# Patient Record
Sex: MALE | Race: WHITE | NOT HISPANIC OR LATINO
[De-identification: names, ages, dates, MRNs, and addresses within clinical notes are randomized per-mention and may not be internally consistent; named-entity substitution may affect disease eponyms.]

---

## 2017-11-10 PROBLEM — Z00.00 ENCOUNTER FOR PREVENTIVE HEALTH EXAMINATION: Status: ACTIVE | Noted: 2017-11-10

## 2021-03-22 ENCOUNTER — APPOINTMENT (OUTPATIENT)
Dept: GASTROENTEROLOGY | Facility: CLINIC | Age: 76
End: 2021-03-22
Payer: COMMERCIAL

## 2021-03-22 VITALS
HEART RATE: 69 BPM | OXYGEN SATURATION: 97 % | DIASTOLIC BLOOD PRESSURE: 81 MMHG | WEIGHT: 249 LBS | HEIGHT: 72 IN | SYSTOLIC BLOOD PRESSURE: 130 MMHG | TEMPERATURE: 96.9 F | BODY MASS INDEX: 33.72 KG/M2

## 2021-03-22 DIAGNOSIS — Z82.49 FAMILY HISTORY OF ISCHEMIC HEART DISEASE AND OTHER DISEASES OF THE CIRCULATORY SYSTEM: ICD-10-CM

## 2021-03-22 DIAGNOSIS — Z87.891 PERSONAL HISTORY OF NICOTINE DEPENDENCE: ICD-10-CM

## 2021-03-22 DIAGNOSIS — Z86.79 PERSONAL HISTORY OF OTHER DISEASES OF THE CIRCULATORY SYSTEM: ICD-10-CM

## 2021-03-22 DIAGNOSIS — Z98.890 OTHER SPECIFIED POSTPROCEDURAL STATES: ICD-10-CM

## 2021-03-22 DIAGNOSIS — Z95.5 PRESENCE OF CORONARY ANGIOPLASTY IMPLANT AND GRAFT: ICD-10-CM

## 2021-03-22 DIAGNOSIS — Z86.39 PERSONAL HISTORY OF OTHER ENDOCRINE, NUTRITIONAL AND METABOLIC DISEASE: ICD-10-CM

## 2021-03-22 PROCEDURE — 99203 OFFICE O/P NEW LOW 30 MIN: CPT

## 2021-03-22 PROCEDURE — 99072 ADDL SUPL MATRL&STAF TM PHE: CPT

## 2021-03-22 RX ORDER — LOSARTAN POTASSIUM 100 MG/1
100 TABLET, FILM COATED ORAL
Qty: 90 | Refills: 0 | Status: ACTIVE | COMMUNITY
Start: 2021-02-04

## 2021-03-22 RX ORDER — ATORVASTATIN CALCIUM 40 MG/1
40 TABLET, FILM COATED ORAL
Qty: 90 | Refills: 0 | Status: ACTIVE | COMMUNITY
Start: 2020-12-15

## 2021-03-22 RX ORDER — MULTIVITAMIN
TABLET ORAL
Refills: 0 | Status: ACTIVE | COMMUNITY

## 2021-03-22 RX ORDER — GINSENG 100 MG
CAPSULE ORAL
Refills: 0 | Status: ACTIVE | COMMUNITY

## 2021-03-22 RX ORDER — ASPIRIN 81 MG
81 TABLET, DELAYED RELEASE (ENTERIC COATED) ORAL
Refills: 0 | Status: ACTIVE | COMMUNITY

## 2021-03-22 RX ORDER — CLOPIDOGREL BISULFATE 75 MG/1
75 TABLET, FILM COATED ORAL
Qty: 90 | Refills: 0 | Status: ACTIVE | COMMUNITY
Start: 2021-03-09

## 2021-03-22 RX ORDER — CARVEDILOL 12.5 MG/1
12.5 TABLET, FILM COATED ORAL
Qty: 180 | Refills: 0 | Status: ACTIVE | COMMUNITY
Start: 2021-01-08

## 2021-03-22 RX ORDER — LEVOTHYROXINE SODIUM 0.09 MG/1
88 TABLET ORAL
Qty: 90 | Refills: 0 | Status: ACTIVE | COMMUNITY
Start: 2021-02-04

## 2021-03-23 NOTE — REASON FOR VISIT
[Initial Evaluation] : an initial evaluation [FreeTextEntry1] : History of adenomatous colonic polyps

## 2021-03-23 NOTE — HISTORY OF PRESENT ILLNESS
[FreeTextEntry1] : The patient is a 75-year-old man with a history of adenomatous colonic polyps.  His last colonoscopy was in January 2017.  He has a history of 2 cardiac stents placed in August 2015 and then in August 2020.  After the admission for the stent in August the patient had a subsequent admission for an ablation of PVCs.  He currently feels well denying abdominal pain, diarrhea, constipation, melena, bright red blood per rectum.  The patient reports 1 bowel movement a day.  He denies heartburn or dysphagia.  The patient's weight is stable.  The patient was hospitalized for stent and ablation in August 2020 as above.

## 2021-03-23 NOTE — ASSESSMENT
[FreeTextEntry1] : Patient with a history of adenomatous colonic polyps.  He had a cardiac stent and an ablation in August 2020.  I advised the patient that it is safest to wait at least 1 year until after stent placement prior to pursuing an elective colonoscopy and holding clopidogrel.\par \par Therefore, the patient will return to see me in 6 months.  At that time, we will pursue colonoscopy if adequate cardiac clearance is given and we can hold the clopidogrel for 5 days.

## 2021-03-23 NOTE — CONSULT LETTER
[FreeTextEntry1] : Dear Dr. Bryan No and Dr. Marty Mejia,\par \par I had the pleasure of seeing your patient AUGUSTO GOODEN in the office today.  My office note is attached. PLEASE READ THE "ASSESSMENT" SECTION OF THE NOTE TO SEE MY IMPRESSION AND PLAN.\par \par Thank you very much for allowing me to participate in the care of your patient.\par \par Sincerely,\par \par Randy Kelly M.D., FAC, FACP\par Director, Celiac Program at [facility]  of Medicine\par [place] [employer] at [facility] Practice Director,\[facility] - Gastroenterology/Internal Medicine at [place]\[facility] [address]\[place] [address]\par Tel: [phone]\par Email: [email or web address] \par \par The attached note has been created using a voice recognition system (Dragon).  There may be some misspellings and typos.  Please call my office if you have any issues or questions.

## 2021-11-17 ENCOUNTER — APPOINTMENT (OUTPATIENT)
Dept: GASTROENTEROLOGY | Facility: CLINIC | Age: 76
End: 2021-11-17
Payer: COMMERCIAL

## 2021-11-17 VITALS
SYSTOLIC BLOOD PRESSURE: 128 MMHG | TEMPERATURE: 96.9 F | HEIGHT: 72 IN | WEIGHT: 252 LBS | BODY MASS INDEX: 34.13 KG/M2 | DIASTOLIC BLOOD PRESSURE: 78 MMHG

## 2021-11-17 DIAGNOSIS — Z20.822 CONTACT WITH AND (SUSPECTED) EXPOSURE TO COVID-19: ICD-10-CM

## 2021-11-17 DIAGNOSIS — Z86.010 PERSONAL HISTORY OF COLONIC POLYPS: ICD-10-CM

## 2021-11-17 DIAGNOSIS — Z01.818 ENCOUNTER FOR OTHER PREPROCEDURAL EXAMINATION: ICD-10-CM

## 2021-11-17 PROCEDURE — 99213 OFFICE O/P EST LOW 20 MIN: CPT

## 2021-11-17 RX ORDER — SODIUM SULFATE, POTASSIUM SULFATE, MAGNESIUM SULFATE 17.5; 3.13; 1.6 G/ML; G/ML; G/ML
17.5-3.13-1.6 SOLUTION, CONCENTRATE ORAL
Qty: 1 | Refills: 0 | Status: ACTIVE | COMMUNITY
Start: 2021-11-17 | End: 1900-01-01

## 2021-11-17 RX ORDER — MUPIROCIN 20 MG/G
2 OINTMENT TOPICAL
Qty: 22 | Refills: 0 | Status: DISCONTINUED | COMMUNITY
Start: 2020-11-12 | End: 2021-11-17

## 2021-11-17 RX ORDER — TRIAMCINOLONE ACETONIDE 1 MG/G
0.1 CREAM TOPICAL
Qty: 80 | Refills: 0 | Status: DISCONTINUED | COMMUNITY
Start: 2020-11-12 | End: 2021-11-17

## 2021-11-17 RX ORDER — PREDNISOLONE ACETATE 10 MG/ML
1 SUSPENSION/ DROPS OPHTHALMIC
Qty: 5 | Refills: 0 | Status: DISCONTINUED | COMMUNITY
Start: 2021-01-26 | End: 2021-11-17

## 2021-11-18 NOTE — CONSULT LETTER
[FreeTextEntry1] : Dear Dr. Bryan No and Dr. Marty Mejia,\par \par I had the pleasure of seeing your patient AUGUSTO GOODEN in the office today.  My office note is attached. PLEASE READ THE "ASSESSMENT" SECTION OF THE NOTE TO SEE MY IMPRESSION AND PLAN.\par \par Thank you very much for allowing me to participate in the care of your patient.\par \par Sincerely,\par \par Randy Kelly M.D., FAC, FACP\par Director, Celiac Program at [facility]  of Medicine\par [place] [employer] at [facility] Practice Director,\[facility] - Gastroenterology/Internal Medicine at [place]\[facility] [address]\[place] [address]\par Tel: [phone]\par Email: [email or web address]\par \par \par The attached note has been created using a voice recognition system (Dragon).  There may be some misspellings and typos.  Please call my office if you have any issues or questions.

## 2021-11-18 NOTE — HISTORY OF PRESENT ILLNESS
[FreeTextEntry1] : The patient has a history of adenomatous colonic polyps.  He feels well denying abdominal pain, diarrhea, constipation.  He reports 1 solid bowel movement a day without melena or bright red blood per rectum.  He denies heartburn or dysphagia.  The patient's weight is stable.  He has a history of cardiac stent, last placed in August 2020 and cardiac ablation of PVCs.  He has not been hospitalized in the past year.\par \par \par Note from 3/22/2021 - The patient is a 75-year-old man with a history of adenomatous colonic polyps.  His last colonoscopy was in January 2017.  He has a history of 2 cardiac stents placed in August 2015 and then in August 2020.  After the admission for the stent in August the patient had a subsequent admission for an ablation of PVCs.  He currently feels well denying abdominal pain, diarrhea, constipation, melena, bright red blood per rectum.  The patient reports 1 bowel movement a day.  He denies heartburn or dysphagia.  The patient's weight is stable.  The patient was hospitalized for stent and ablation in August 2020 as above.

## 2021-11-18 NOTE — ASSESSMENT
[FreeTextEntry1] : Patient with a history of adenomatous colonic polyps.\par \par A colonoscopy has been scheduled. The risks, benefits, alternatives, and limitations of the procedure, including the possibility of missed lesions, were explained.  The patient will require both cardiac and anesthesia clearance prior to the procedure as well as permission to hold clopidogrel for 5 days prior to the procedure.  He can continue aspirin up to the colonoscopy.\par \par \par Plan from 3/22/2021 - Patient with a history of adenomatous colonic polyps.  He had a cardiac stent and an ablation in August 2020.  I advised the patient that it is safest to wait at least 1 year until after stent placement prior to pursuing an elective colonoscopy and holding clopidogrel.\par \par Therefore, the patient will return to see me in 6 months.  At that time, we will pursue colonoscopy if adequate cardiac clearance is given and we can hold the clopidogrel for 5 days.

## 2022-01-18 LAB — SARS-COV-2 N GENE NPH QL NAA+PROBE: NOT DETECTED

## 2022-01-20 ENCOUNTER — APPOINTMENT (OUTPATIENT)
Dept: GASTROENTEROLOGY | Facility: AMBULATORY MEDICAL SERVICES | Age: 77
End: 2022-01-20
Payer: COMMERCIAL

## 2022-01-20 PROCEDURE — 45385 COLONOSCOPY W/LESION REMOVAL: CPT

## 2022-01-20 PROCEDURE — 45380 COLONOSCOPY AND BIOPSY: CPT | Mod: 59

## 2022-01-24 ENCOUNTER — APPOINTMENT (OUTPATIENT)
Dept: GASTROENTEROLOGY | Facility: CLINIC | Age: 77
End: 2022-01-24

## 2022-02-03 ENCOUNTER — APPOINTMENT (OUTPATIENT)
Dept: GASTROENTEROLOGY | Facility: CLINIC | Age: 77
End: 2022-02-03
Payer: COMMERCIAL

## 2022-02-03 VITALS
SYSTOLIC BLOOD PRESSURE: 124 MMHG | TEMPERATURE: 97.3 F | DIASTOLIC BLOOD PRESSURE: 82 MMHG | HEIGHT: 72 IN | BODY MASS INDEX: 34.27 KG/M2 | WEIGHT: 253 LBS

## 2022-02-03 DIAGNOSIS — K64.8 OTHER HEMORRHOIDS: ICD-10-CM

## 2022-02-03 DIAGNOSIS — K57.30 DIVERTICULOSIS OF LARGE INTESTINE W/OUT PERFORATION OR ABSCESS W/OUT BLEEDING: ICD-10-CM

## 2022-02-03 DIAGNOSIS — D12.6 BENIGN NEOPLASM OF COLON, UNSPECIFIED: ICD-10-CM

## 2022-02-03 PROCEDURE — 99213 OFFICE O/P EST LOW 20 MIN: CPT

## 2022-02-04 NOTE — ASSESSMENT
[FreeTextEntry1] : Patient with multiple adenomatous colonic polyps and diverticulosis.\par \par We will repeat a colonoscopy in 3 years that is January 2025.\par \par Information was given to the patient regarding diverticulosis and a high-fiber diet.\par \par \par Plan from 11/17/2021 - Patient with a history of adenomatous colonic polyps.\par \par A colonoscopy has been scheduled. The risks, benefits, alternatives, and limitations of the procedure, including the possibility of missed lesions, were explained.  The patient will require both cardiac and anesthesia clearance prior to the procedure as well as permission to hold clopidogrel for 5 days prior to the procedure.  He can continue aspirin up to the colonoscopy.\par \par \par Plan from 3/22/2021 - Patient with a history of adenomatous colonic polyps.  He had a cardiac stent and an ablation in August 2020.  I advised the patient that it is safest to wait at least 1 year until after stent placement prior to pursuing an elective colonoscopy and holding clopidogrel.\par \par Therefore, the patient will return to see me in 6 months.  At that time, we will pursue colonoscopy if adequate cardiac clearance is given and we can hold the clopidogrel for 5 days.

## 2022-02-04 NOTE — HISTORY OF PRESENT ILLNESS
[FreeTextEntry1] : We reviewed the patient's colonoscopy performed on January 20, 2022.  The examination was significant for removal of 9 polyps, 7 of which were tubular adenomas and 2 were hyperplastic.  The patient also has moderate sigmoid diverticulosis as well as internal hemorrhoids which are asymptomatic.  The patient denies abdominal pain.  Bowel movements are normal without melena or bright red blood per rectum.\par \par \par Note from 11/17/2021 - The patient has a history of adenomatous colonic polyps.  He feels well denying abdominal pain, diarrhea, constipation.  He reports 1 solid bowel movement a day without melena or bright red blood per rectum.  He denies heartburn or dysphagia.  The patient's weight is stable.  He has a history of cardiac stent, last placed in August 2020 and cardiac ablation of PVCs.  He has not been hospitalized in the past year.\par \par \par Note from 3/22/2021 - The patient is a 75-year-old man with a history of adenomatous colonic polyps.  His last colonoscopy was in January 2017.  He has a history of 2 cardiac stents placed in August 2015 and then in August 2020.  After the admission for the stent in August the patient had a subsequent admission for an ablation of PVCs.  He currently feels well denying abdominal pain, diarrhea, constipation, melena, bright red blood per rectum.  The patient reports 1 bowel movement a day.  He denies heartburn or dysphagia.  The patient's weight is stable.  The patient was hospitalized for stent and ablation in August 2020 as above.

## 2022-02-04 NOTE — CONSULT LETTER
[FreeTextEntry1] : Dear Dr. Bryan No and Dr. Marty Mejia,\par \par I had the pleasure of seeing your patient AUGUSTO GOODEN in the office today.  My office note is attached. PLEASE READ THE "ASSESSMENT" SECTION OF THE NOTE TO SEE MY IMPRESSION AND PLAN.\par \par Thank you very much for allowing me to participate in the care of your patient.\par \par Sincerely,\par \par Randy Kelly M.D., FAC, FACP\par Director, Celiac Program at [facility]  of Medicine\par [place] [employer] at [facility] Practice Director,\[facility] - Gastroenterology/Internal Medicine at [place]\[facility] [address]\[place] [address]\par Tel: [phone]\par Email: [email or web address]\par \par \par The attached note has been created using a voice recognition system (Dragon).  There may be some misspellings and typos.  Please call my office if you have any issues or questions.